# Patient Record
Sex: MALE | Race: WHITE | HISPANIC OR LATINO | URBAN - METROPOLITAN AREA
[De-identification: names, ages, dates, MRNs, and addresses within clinical notes are randomized per-mention and may not be internally consistent; named-entity substitution may affect disease eponyms.]

---

## 2021-12-20 ENCOUNTER — APPOINTMENT (RX ONLY)
Dept: URBAN - METROPOLITAN AREA CLINIC 41 | Facility: CLINIC | Age: 26
Setting detail: DERMATOLOGY
End: 2021-12-20

## 2021-12-20 DIAGNOSIS — B07.8 OTHER VIRAL WARTS: ICD-10-CM

## 2021-12-20 DIAGNOSIS — B35.1 TINEA UNGUIUM: ICD-10-CM

## 2021-12-20 PROCEDURE — 99203 OFFICE O/P NEW LOW 30 MIN: CPT

## 2021-12-20 PROCEDURE — ? TREATMENT REGIMEN

## 2021-12-20 PROCEDURE — ? PRESCRIPTION

## 2021-12-20 PROCEDURE — ? COUNSELING

## 2021-12-20 RX ORDER — CICLOPIROX OLAMINE 7.7 MG/G
THIN LAYER CREAM TOPICAL BID
Qty: 90 | Refills: 3 | Status: ERX | COMMUNITY
Start: 2021-12-20

## 2021-12-20 RX ORDER — PHARMACY COMPOUNDING ACCESSORY
PINPOINT AMOUNT EACH MISCELLANEOUS ONCE DAILY
Qty: 1 | Refills: 0 | Status: ERX | COMMUNITY
Start: 2021-12-20

## 2021-12-20 RX ADMIN — CICLOPIROX OLAMINE THIN LAYER: 7.7 CREAM TOPICAL at 00:00

## 2021-12-20 RX ADMIN — Medication PINPOINT AMOUNT: at 00:00

## 2021-12-20 ASSESSMENT — LOCATION DETAILED DESCRIPTION DERM
LOCATION DETAILED: LEFT DISTAL PALMAR SMALL FINGER
LOCATION DETAILED: RIGHT GREAT TOENAIL

## 2021-12-20 ASSESSMENT — LOCATION SIMPLE DESCRIPTION DERM
LOCATION SIMPLE: RIGHT GREAT TOE
LOCATION SIMPLE: LEFT SMALL FINGER

## 2021-12-20 ASSESSMENT — LOCATION ZONE DERM
LOCATION ZONE: TOENAIL
LOCATION ZONE: FINGER

## 2021-12-20 NOTE — PROCEDURE: TREATMENT REGIMEN
Detail Level: Zone
Plan: Pt states freezing did not work well. He likes the squaric acid that he has used in the past. We will send it in today, instructions discussed in detail at the visit and patient information handout provided.
Initiate Treatment: Squaric acid: apply pinpoint amount to wart once weekly. Cover with tape and wash off after 6-8 hours.
Plan: We will have patient f/u on a day when Dr. Castrejon is here to consult.
Initiate Treatment: Ciclopirox cream: apply thin layer on, around, and under toenail 1-2x daily, especially after shower